# Patient Record
Sex: FEMALE | URBAN - METROPOLITAN AREA
[De-identification: names, ages, dates, MRNs, and addresses within clinical notes are randomized per-mention and may not be internally consistent; named-entity substitution may affect disease eponyms.]

---

## 2023-03-30 ENCOUNTER — APPOINTMENT (RX ONLY)
Dept: URBAN - METROPOLITAN AREA CLINIC 336 | Facility: CLINIC | Age: 33
Setting detail: DERMATOLOGY
End: 2023-03-30

## 2023-03-30 DIAGNOSIS — L71.0 PERIORAL DERMATITIS: ICD-10-CM | Status: INADEQUATELY CONTROLLED

## 2023-03-30 PROCEDURE — ? PRESCRIPTION

## 2023-03-30 PROCEDURE — ? PRESCRIPTION MEDICATION MANAGEMENT

## 2023-03-30 PROCEDURE — ? COUNSELING

## 2023-03-30 PROCEDURE — ? OTC TREATMENT REGIMEN

## 2023-03-30 PROCEDURE — 99203 OFFICE O/P NEW LOW 30 MIN: CPT

## 2023-03-30 RX ORDER — DOXYCYCLINE HYCLATE 100 MG/1
CAPSULE, GELATIN COATED ORAL
Qty: 30 | Refills: 2 | Status: ERX

## 2023-03-30 RX ORDER — DAPSONE 75 MG/G
GEL TOPICAL
Qty: 60 | Refills: 2 | Status: ERX | COMMUNITY
Start: 2023-03-30

## 2023-03-30 RX ADMIN — DAPSONE: 75 GEL TOPICAL at 00:00

## 2023-03-30 ASSESSMENT — LOCATION ZONE DERM: LOCATION ZONE: FACE

## 2023-03-30 ASSESSMENT — LOCATION SIMPLE DESCRIPTION DERM: LOCATION SIMPLE: CHIN

## 2023-03-30 ASSESSMENT — LOCATION DETAILED DESCRIPTION DERM: LOCATION DETAILED: LEFT CHIN

## 2023-03-30 NOTE — PROCEDURE: PRESCRIPTION MEDICATION MANAGEMENT
Initiate Treatment: Doxy 100- Take one pill once a day with food and water \\nAczone- Apply pea sized amount to full face twice daily after cleansing
Detail Level: Zone
Discontinue Regimen: Tretinoin 0.05 (FOR NOW)
Render In Strict Bullet Format?: No

## 2023-03-30 NOTE — PROCEDURE: OTC TREATMENT REGIMEN
Detail Level: Generalized
Patient Specific Otc Recommendations (Will Not Stick From Patient To Patient): Vanicream products
Samples Given: Vanicream moisturizers \\nCeraVe Facial Cleanser

## 2023-11-20 ENCOUNTER — APPOINTMENT (RX ONLY)
Dept: URBAN - METROPOLITAN AREA CLINIC 336 | Facility: CLINIC | Age: 33
Setting detail: DERMATOLOGY
End: 2023-11-20

## 2023-11-20 DIAGNOSIS — R21 RASH AND OTHER NONSPECIFIC SKIN ERUPTION: ICD-10-CM | Status: INADEQUATELY CONTROLLED

## 2023-11-20 PROCEDURE — ? PRESCRIPTION MEDICATION MANAGEMENT

## 2023-11-20 PROCEDURE — ? OTHER

## 2023-11-20 PROCEDURE — 99213 OFFICE O/P EST LOW 20 MIN: CPT

## 2023-11-20 PROCEDURE — ? COUNSELING

## 2023-11-20 PROCEDURE — ? OTC TREATMENT REGIMEN

## 2023-11-20 PROCEDURE — ? PRESCRIPTION

## 2023-11-20 RX ORDER — TRIAMCINOLONE ACETONIDE 1 MG/G
CREAM TOPICAL BID
Qty: 454 | Refills: 2 | Status: ERX | COMMUNITY
Start: 2023-11-20

## 2023-11-20 RX ADMIN — TRIAMCINOLONE ACETONIDE: 1 CREAM TOPICAL at 00:00

## 2023-11-20 ASSESSMENT — LOCATION SIMPLE DESCRIPTION DERM
LOCATION SIMPLE: LEFT LOWER BACK
LOCATION SIMPLE: LEFT UPPER BACK

## 2023-11-20 ASSESSMENT — LOCATION ZONE DERM
LOCATION ZONE: TRUNK
LOCATION ZONE: TRUNK

## 2023-11-20 ASSESSMENT — LOCATION DETAILED DESCRIPTION DERM
LOCATION DETAILED: LEFT SUPERIOR MEDIAL UPPER BACK
LOCATION DETAILED: LEFT SUPERIOR LATERAL MIDBACK

## 2023-11-20 NOTE — PROCEDURE: OTC TREATMENT REGIMEN
Detail Level: Zone
Patient Specific Otc Recommendations (Will Not Stick From Patient To Patient): D/c turmeric supplement \\nD/c dryer sheets\\nZyrtec BID

## 2023-11-20 NOTE — PROCEDURE: OTHER
Render Risk Assessment In Note?: no
Detail Level: Zone
Note Text (......Xxx Chief Complaint.): This diagnosis correlates with the
Other (Free Text): Discussed patch testing

## 2023-11-20 NOTE — PROCEDURE: PRESCRIPTION MEDICATION MANAGEMENT
Initiate Treatment: Triamcinolone- Apply twice daily to affected areas for 2 weeks, then as needed for flares.
Detail Level: Zone
Render In Strict Bullet Format?: No

## 2025-07-31 ENCOUNTER — APPOINTMENT (OUTPATIENT)
Dept: URBAN - METROPOLITAN AREA CLINIC 336 | Facility: CLINIC | Age: 35
Setting detail: DERMATOLOGY
End: 2025-07-31

## 2025-07-31 DIAGNOSIS — D18.0 HEMANGIOMA: ICD-10-CM | Status: STABLE

## 2025-07-31 DIAGNOSIS — B07.8 OTHER VIRAL WARTS: ICD-10-CM | Status: INADEQUATELY CONTROLLED

## 2025-07-31 DIAGNOSIS — L82.1 OTHER SEBORRHEIC KERATOSIS: ICD-10-CM | Status: STABLE

## 2025-07-31 PROBLEM — D18.01 HEMANGIOMA OF SKIN AND SUBCUTANEOUS TISSUE: Status: ACTIVE | Noted: 2025-07-31

## 2025-07-31 PROCEDURE — ? COUNSELING

## 2025-07-31 PROCEDURE — ? LIQUID NITROGEN

## 2025-07-31 ASSESSMENT — LOCATION SIMPLE DESCRIPTION DERM
LOCATION SIMPLE: RIGHT ANTERIOR NECK
LOCATION SIMPLE: RIGHT SUPERIOR MUCOSAL LIP
LOCATION SIMPLE: LEFT THIGH

## 2025-07-31 ASSESSMENT — LOCATION DETAILED DESCRIPTION DERM
LOCATION DETAILED: RIGHT INFERIOR ANTERIOR NECK
LOCATION DETAILED: LEFT ANTERIOR PROXIMAL THIGH
LOCATION DETAILED: RIGHT SUPERIOR MUCOSAL LIP

## 2025-07-31 ASSESSMENT — LOCATION ZONE DERM
LOCATION ZONE: NECK
LOCATION ZONE: LIP
LOCATION ZONE: LEG

## 2025-07-31 NOTE — PROCEDURE: REASSURANCE
Include Location In Plan?: No
Additional Note: Reassured patient lesion is benign.
Detail Level: Zone

## 2025-07-31 NOTE — PROCEDURE: LIQUID NITROGEN
Show Topical Anesthesia Variable?: Yes
Number Of Freeze-Thaw Cycles: 1 freeze-thaw cycle
Post-Care Instructions: I reviewed with the patient in detail post-care instructions. Patient is to wear sunprotection, and avoid picking at any of the treated lesions. Pt may apply Aquaphor to crusted or scabbing areas.
Spray Paint Text: The liquid nitrogen was applied to the skin utilizing a spray paint frosting technique.
Medical Necessity Clause: This procedure was medically necessary because the lesions that were treated were:
Render Note In Bullet Format When Appropriate: No
Medical Necessity Information: It is in your best interest to select a reason for this procedure from the list below. All of these items fulfill various CMS LCD requirements except the new and changing color options.
Detail Level: Detailed
Consent: VERBAL consent was obtained including but not limited to risks of crusting, scabbing, blistering, scarring, darker or lighter pigmentary change, recurrence, incomplete removal and infection.

## 2025-08-12 ENCOUNTER — APPOINTMENT (OUTPATIENT)
Dept: URBAN - METROPOLITAN AREA CLINIC 336 | Facility: CLINIC | Age: 35
Setting detail: DERMATOLOGY
End: 2025-08-12

## 2025-08-12 DIAGNOSIS — B07.8 OTHER VIRAL WARTS: ICD-10-CM | Status: INADEQUATELY CONTROLLED

## 2025-08-12 DIAGNOSIS — L90.8 OTHER ATROPHIC DISORDERS OF SKIN: ICD-10-CM | Status: STABLE

## 2025-08-12 DIAGNOSIS — D18.0 HEMANGIOMA: ICD-10-CM | Status: STABLE

## 2025-08-12 DIAGNOSIS — L73.9 FOLLICULAR DISORDER, UNSPECIFIED: ICD-10-CM | Status: INADEQUATELY CONTROLLED

## 2025-08-12 PROBLEM — D18.01 HEMANGIOMA OF SKIN AND SUBCUTANEOUS TISSUE: Status: ACTIVE | Noted: 2025-08-12

## 2025-08-12 PROCEDURE — ? LIQUID NITROGEN

## 2025-08-12 PROCEDURE — ? PRESCRIPTION MEDICATION MANAGEMENT

## 2025-08-12 PROCEDURE — ? COUNSELING

## 2025-08-12 PROCEDURE — ? PRESCRIPTION

## 2025-08-12 RX ORDER — CLINDAMYCIN PHOSPHATE 10 MG/ML
LOTION TOPICAL QD
Qty: 60 | Refills: 3 | Status: ERX | COMMUNITY
Start: 2025-08-12

## 2025-08-12 RX ADMIN — CLINDAMYCIN PHOSPHATE: 10 LOTION TOPICAL at 00:00

## 2025-08-12 ASSESSMENT — LOCATION SIMPLE DESCRIPTION DERM
LOCATION SIMPLE: LEFT AXILLARY VAULT
LOCATION SIMPLE: RIGHT AXILLARY VAULT
LOCATION SIMPLE: ABDOMEN
LOCATION SIMPLE: RIGHT ANTERIOR NECK
LOCATION SIMPLE: LEFT CHEEK
LOCATION SIMPLE: RIGHT CHEEK

## 2025-08-12 ASSESSMENT — LOCATION DETAILED DESCRIPTION DERM
LOCATION DETAILED: LEFT INFERIOR MEDIAL MALAR CHEEK
LOCATION DETAILED: RIGHT AXILLARY VAULT
LOCATION DETAILED: RIGHT INFERIOR LATERAL NECK
LOCATION DETAILED: RIGHT INFERIOR MEDIAL MALAR CHEEK
LOCATION DETAILED: PERIUMBILICAL SKIN
LOCATION DETAILED: LEFT AXILLARY VAULT

## 2025-08-12 ASSESSMENT — TOTAL NUMBER OF VERRUCA VULGARIS: # OF LESIONS?: 1

## 2025-08-12 ASSESSMENT — LOCATION ZONE DERM
LOCATION ZONE: FACE
LOCATION ZONE: AXILLAE
LOCATION ZONE: NECK
LOCATION ZONE: TRUNK